# Patient Record
Sex: FEMALE | Race: AMERICAN INDIAN OR ALASKA NATIVE
[De-identification: names, ages, dates, MRNs, and addresses within clinical notes are randomized per-mention and may not be internally consistent; named-entity substitution may affect disease eponyms.]

---

## 2020-08-21 ENCOUNTER — HOSPITAL ENCOUNTER (EMERGENCY)
Dept: HOSPITAL 5 - ED | Age: 50
LOS: 1 days | Discharge: HOME | End: 2020-08-22
Payer: SELF-PAY

## 2020-08-21 DIAGNOSIS — E11.9: ICD-10-CM

## 2020-08-21 DIAGNOSIS — M54.5: Primary | ICD-10-CM

## 2020-08-21 DIAGNOSIS — Z79.899: ICD-10-CM

## 2020-08-21 DIAGNOSIS — M13.88: ICD-10-CM

## 2020-08-21 DIAGNOSIS — M25.572: ICD-10-CM

## 2020-08-21 DIAGNOSIS — I11.0: ICD-10-CM

## 2020-08-21 DIAGNOSIS — I50.9: ICD-10-CM

## 2020-08-21 PROCEDURE — 99284 EMERGENCY DEPT VISIT MOD MDM: CPT

## 2020-08-21 PROCEDURE — 96372 THER/PROPH/DIAG INJ SC/IM: CPT

## 2020-08-21 PROCEDURE — 72100 X-RAY EXAM L-S SPINE 2/3 VWS: CPT

## 2020-08-21 PROCEDURE — 72131 CT LUMBAR SPINE W/O DYE: CPT

## 2020-08-22 VITALS — SYSTOLIC BLOOD PRESSURE: 160 MMHG | DIASTOLIC BLOOD PRESSURE: 90 MMHG
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
## 2021-02-21 ENCOUNTER — HOSPITAL ENCOUNTER (EMERGENCY)
Dept: HOSPITAL 5 - ED | Age: 51
LOS: 1 days | Discharge: HOME | End: 2021-02-22
Payer: COMMERCIAL

## 2021-02-21 DIAGNOSIS — J02.9: ICD-10-CM

## 2021-02-21 DIAGNOSIS — Z88.8: ICD-10-CM

## 2021-02-21 DIAGNOSIS — Z98.51: ICD-10-CM

## 2021-02-21 DIAGNOSIS — I11.0: ICD-10-CM

## 2021-02-21 DIAGNOSIS — J40: Primary | ICD-10-CM

## 2021-02-21 DIAGNOSIS — R05: ICD-10-CM

## 2021-02-21 DIAGNOSIS — Z79.899: ICD-10-CM

## 2021-02-21 DIAGNOSIS — J44.9: ICD-10-CM

## 2021-02-21 DIAGNOSIS — Z79.1: ICD-10-CM

## 2021-02-21 DIAGNOSIS — Z98.890: ICD-10-CM

## 2021-02-21 DIAGNOSIS — I50.9: ICD-10-CM

## 2021-02-21 DIAGNOSIS — M19.91: ICD-10-CM

## 2021-02-21 PROCEDURE — 99282 EMERGENCY DEPT VISIT SF MDM: CPT

## 2021-02-21 NOTE — EMERGENCY DEPARTMENT REPORT
- General


Chief Complaint: Upper Respiratory Infection


Stated Complaint: FLU SX/CHEST PAIN/ANGE


Time Seen by Provider: 02/21/21 23:12


Source: patient


Mode of arrival: Ambulatory


Limitations: No Limitations





- History of Present Illness


Initial Comments: 





50-year-old obese American female past medical history of hypertension, 

diabetes, arthritis presents emergency department complaining of a 2-day history

of sudden onset of runny nose, nasal congestion, myalgia, productive cough, sore

throat with occasional chills and hot flashes of an unknown etiology.  No 

diarrhea or constipation.  No vomiting or rashes.  She has no known contact with

the coronavirus


MD Complaint: cough, sore throat, rhinorrhea, nasal congestion, sinus pain


-: Sudden


Severity: mild


Consistency: constant


Improves With: nothing


Worsens With: nothing


Associated Symptoms: chills, myalgias, rhinorrhea, nasal congestion, sore 

throat, cough.  denies: abdominal pain, nausea, vomiting, right sweats, weight 

loss, epistaxis, hoarseness





- Related Data


                                Home Medications











 Medication  Instructions  Recorded  Confirmed  Last Taken


 


K-Tab ER 10 meq PO TID 06/09/15 06/10/15 Unknown


 


Lisinopril 20 mg PO DAILY 06/09/15 06/09/15 Unknown


 


Lovastatin 10 mg PO DAILY 06/09/15 06/09/15 Unknown


 


Aspirin 325 mg PO DAILY 06/10/15 06/10/15 Unknown


 


Fiorinal with Codeine #3 Cap 2 tab PO Q4H PRN 06/10/15 06/10/15 Unknown


 


Furosemide 20 mg PO BID 06/10/15 06/10/15 Unknown


 


Isosorbide Mononitrate ER 30 mg PO DAILY 06/10/15 06/10/15 Unknown


 


Metoprolol 25 mg PO BID 06/10/15 06/10/15 Unknown


 


Neurontin 600 mg PO TID 06/10/15 06/10/15 Unknown


 


metFORMIN 500 mg PO BID 06/10/15 06/10/15 Unknown








                                  Previous Rx's











 Medication  Instructions  Recorded  Last Taken  Type


 


Ibuprofen [Motrin 800 MG tab] 800 mg PO Q8HR PRN #30 tablet 08/22/20 Unknown Rx


 


tiZANidine [Zanaflex 4mg TAB] 4 mg PO Q8H PRN #15 tablet 08/22/20 Unknown Rx


 


Albuterol Mdi (or & Nicu Only) 2 puff IH QID PRN #1 inhalation 02/21/21 Unknown 

Rx





[ProAir HFA Inhaler]    


 


Azithromycin [Zithromax] 500 mg PO QDAY #5 tablet 02/21/21 Unknown Rx


 


Benzonatate [Tessalon Perles] 100 mg PO Q8HR #20 capsule 02/21/21 Unknown Rx











                                    Allergies











Allergy/AdvReac Type Severity Reaction Status Date / Time


 


ibuprofen [From Motrin] Allergy  Unknown Verified 02/21/21 20:14


 


pneumococcal vaccine Allergy  Unknown Verified 02/21/21 20:14














ED Review of Systems


ROS: 


Stated complaint: FLU SX/CHEST PAIN/ANGE


Other details as noted in HPI





Comment: All other systems reviewed and negative





ED Past Medical Hx





- Past Medical History


Previous Medical History?: Yes


Hx Hypertension: Yes


Hx Congestive Heart Failure: Yes


Hx Diabetes: Yes


Hx Arthritis: Yes


Hx Asthma: Yes


Hx COPD: Yes


Additional medical history: Neuropathy,





- Surgical History


Past Surgical History?: Yes


Additional Surgical History: Tubal Ligation, Lump removed fro Right upper arm & 

left thigh,





- Social History


Smoking Status: Never Smoker


Substance Use Type: None





- Medications


Home Medications: 


                                Home Medications











 Medication  Instructions  Recorded  Confirmed  Last Taken  Type


 


K-Tab ER 10 meq PO TID 06/09/15 06/10/15 Unknown History


 


Lisinopril 20 mg PO DAILY 06/09/15 06/09/15 Unknown History


 


Lovastatin 10 mg PO DAILY 06/09/15 06/09/15 Unknown History


 


Aspirin 325 mg PO DAILY 06/10/15 06/10/15 Unknown History


 


Fiorinal with Codeine #3 Cap 2 tab PO Q4H PRN 06/10/15 06/10/15 Unknown History


 


Furosemide 20 mg PO BID 06/10/15 06/10/15 Unknown History


 


Isosorbide Mononitrate ER 30 mg PO DAILY 06/10/15 06/10/15 Unknown History


 


Metoprolol 25 mg PO BID 06/10/15 06/10/15 Unknown History


 


Neurontin 600 mg PO TID 06/10/15 06/10/15 Unknown History


 


metFORMIN 500 mg PO BID 06/10/15 06/10/15 Unknown History


 


Ibuprofen [Motrin 800 MG tab] 800 mg PO Q8HR PRN #30 tablet 08/22/20  Unknown Rx


 


tiZANidine [Zanaflex 4mg TAB] 4 mg PO Q8H PRN #15 tablet 08/22/20  Unknown Rx


 


Albuterol Mdi (or & Nicu Only) 2 puff IH QID PRN #1 inhalation 02/21/21  Unknown

Rx





[ProAir HFA Inhaler]     


 


Azithromycin [Zithromax] 500 mg PO QDAY #5 tablet 02/21/21  Unknown Rx


 


Benzonatate [Tessalon Perles] 100 mg PO Q8HR #20 capsule 02/21/21  Unknown Rx














ED Physical Exam





- General


Limitations: No Limitations


General appearance: alert, in no apparent distress





- Head


Head exam: Present: atraumatic, normocephalic





- Eye


Eye exam: Present: normal appearance, PERRL, EOMI


Pupils: Present: normal accommodation





- ENT


ENT exam: Present: normal exam, normal orophraynx, mucous membranes moist, other

(Nasal congestion with clear drainage.  Swelling to the nasal turbinates noted.)





- Neck


Neck exam: Present: normal inspection, full ROM





- Respiratory


Respiratory exam: Present: normal lung sounds bilaterally.  Absent: respiratory 

distress, rales, rhonchi, accessory muscle use, decreased breath sounds





- Cardiovascular


Cardiovascular Exam: Present: regular rate, normal rhythm.  Absent: systolic 

murmur, diastolic murmur, rubs, gallop





- GI/Abdominal


GI/Abdominal exam: Present: soft, normal bowel sounds.  Absent: tenderness, 

guarding, hyperactive bowel sounds, hypoactive bowel sounds, organomegaly





- Extremities Exam


Extremities exam: Present: normal inspection, full ROM





- Back Exam


Back exam: Present: normal inspection.  Absent: CVA tenderness (R), CVA 

tenderness (L), muscle spasm, paraspinal tenderness





- Neurological Exam


Neurological exam: Present: alert, oriented X3, CN II-XII intact, normal gait





- Psychiatric


Psychiatric exam: Present: normal affect, normal mood.  Absent: anxious, flat 

affect, suicidal ideation





- Skin


Skin exam: Present: warm, dry, intact, normal color.  Absent: rash





ED Course





                                   Vital Signs











  02/21/21





  20:13


 


Temperature 98.5 F


 


Pulse Rate 90


 


Respiratory 19





Rate 


 


Blood Pressure 158/84


 


O2 Sat by Pulse 98





Oximetry 














ED Medical Decision Making





- Medical Decision Making





This patient presents with acute cough, most consistent with bronchitis. 

Differential diagnosis includes bronchitis, asthma, hyperreactive airway 

disease, viral syndrome. Presentation not consistent with acute bacterial 

pneumonia, influenza, asthma, transient airway hyperresponsiveness. Presentation

not consistent with chronic causes of cough (including GERD, asthma, postnasal 

discharge, medication side effect, CHF, lung cancer or mass).  This patient 

presents with lower respiratory symptoms concerning for viral syndrome including

flu.  Patient  does not meet criteria for COVID-19.  Doubt pneumonia, sepsis or 

other serious bacterial infection or acute emergent condition.  Is otherwise 

well-appearing with acceptable vitals and reassuring physical examination and is

safe to be discharged home.  Patient lacks serious medical comorbidities that 

would require admission.  Patient is nontoxic and although symptomatic otherwise

safe to go home.  Will provide strict return precautions and instructions on 

self isolation/quarantine and anticipatory guidance.





Plan: We will start on medications for supportive care, reassess


Critical care attestation.: 


If time is entered above; I have spent that time in minutes in the direct care 

of this critically ill patient, excluding procedure time.








ED Disposition


Clinical Impression: 


 Cough, Pharyngitis, Bronchitis





Disposition: DC-01 TO HOME OR SELFCARE


Is pt being admited?: No


Does the pt Need Aspirin: No


Condition: Stable


Instructions:  Chronic Bronchitis (ED), Cool Mist Vaporizer, Upper Respiratory 

Infection, Adult, Easy-to-Read, Pharyngitis, How to Use a Dry Powder Inhaler, 

Easy-to-Read, Cough, Adult


Prescriptions: 


Albuterol Mdi (or & Nicu Only) [ProAir HFA Inhaler] 2 puff IH QID PRN #1 

inhalation


 PRN Reason: Shortness Of Breath


Benzonatate [Tessalon Perles] 100 mg PO Q8HR #20 capsule


Azithromycin [Zithromax] 500 mg PO QDAY #5 tablet


Referrals: 


PRIMARY MD GALINDO [Primary Care Provider] - 3-5 Days


UNIQUE MURILLO MD [Staff Physician] - 3-5 Days

## 2021-02-22 VITALS — DIASTOLIC BLOOD PRESSURE: 103 MMHG | SYSTOLIC BLOOD PRESSURE: 160 MMHG
